# Patient Record
Sex: MALE | Race: WHITE | HISPANIC OR LATINO | Employment: UNEMPLOYED | URBAN - METROPOLITAN AREA
[De-identification: names, ages, dates, MRNs, and addresses within clinical notes are randomized per-mention and may not be internally consistent; named-entity substitution may affect disease eponyms.]

---

## 2021-10-11 ENCOUNTER — HOSPITAL ENCOUNTER (EMERGENCY)
Facility: MEDICAL CENTER | Age: 26
End: 2021-10-11
Attending: EMERGENCY MEDICINE
Payer: MEDICAID

## 2021-10-11 VITALS
OXYGEN SATURATION: 98 % | HEART RATE: 55 BPM | BODY MASS INDEX: 24.38 KG/M2 | SYSTOLIC BLOOD PRESSURE: 110 MMHG | RESPIRATION RATE: 14 BRPM | HEIGHT: 72 IN | TEMPERATURE: 97.8 F | WEIGHT: 180 LBS | DIASTOLIC BLOOD PRESSURE: 72 MMHG

## 2021-10-11 DIAGNOSIS — Z86.59 HISTORY OF BIPOLAR DISORDER: ICD-10-CM

## 2021-10-11 DIAGNOSIS — R45.851 SUICIDAL IDEATION: ICD-10-CM

## 2021-10-11 LAB
AMPHET UR QL SCN: NEGATIVE
BARBITURATES UR QL SCN: NEGATIVE
BENZODIAZ UR QL SCN: NEGATIVE
BZE UR QL SCN: NEGATIVE
CANNABINOIDS UR QL SCN: POSITIVE
METHADONE UR QL SCN: NEGATIVE
OPIATES UR QL SCN: NEGATIVE
OXYCODONE UR QL SCN: NEGATIVE
PCP UR QL SCN: NEGATIVE
POC BREATHALIZER: 0 PERCENT (ref 0–0.01)
PROPOXYPH UR QL SCN: NEGATIVE

## 2021-10-11 PROCEDURE — 99285 EMERGENCY DEPT VISIT HI MDM: CPT

## 2021-10-11 PROCEDURE — 302970 POC BREATHALIZER: Performed by: EMERGENCY MEDICINE

## 2021-10-11 PROCEDURE — 80307 DRUG TEST PRSMV CHEM ANLYZR: CPT

## 2021-10-11 PROCEDURE — 90791 PSYCH DIAGNOSTIC EVALUATION: CPT

## 2021-10-11 PROCEDURE — 99283 EMERGENCY DEPT VISIT LOW MDM: CPT | Mod: 95 | Performed by: STUDENT IN AN ORGANIZED HEALTH CARE EDUCATION/TRAINING PROGRAM

## 2021-10-11 RX ORDER — GABAPENTIN 300 MG/1
300 CAPSULE ORAL 3 TIMES DAILY
COMMUNITY

## 2021-10-11 RX ORDER — CLONAZEPAM 1 MG/1
1 TABLET ORAL 2 TIMES DAILY
COMMUNITY

## 2021-10-11 RX ORDER — PROPRANOLOL HYDROCHLORIDE 20 MG/1
20 TABLET ORAL 2 TIMES DAILY
COMMUNITY

## 2021-10-11 RX ORDER — LAMOTRIGINE 25 MG/1
25 TABLET ORAL 2 TIMES DAILY
COMMUNITY

## 2021-10-11 ASSESSMENT — ENCOUNTER SYMPTOMS
MYALGIAS: 0
DOUBLE VISION: 0
CLAUDICATION: 0
TINGLING: 0
VOMITING: 0
COUGH: 0
SHORTNESS OF BREATH: 0
NAUSEA: 0
HEADACHES: 0
DEPRESSION: 1

## 2021-10-11 ASSESSMENT — LIFESTYLE VARIABLES: SUBSTANCE_ABUSE: 0

## 2021-10-11 NOTE — ED TRIAGE NOTES
Chief Complaint   Patient presents with   • Psych Eval     PT BIB [facility] with c/o having feeling of wanting to hurt himself after his grand Ma passed 3 days ago     Blood Pressure 116/77   Pulse (Abnormal) 50   Temperature 36.1 °C (97 °F) (Temporal)   Respiration 14   Height 1.829 m (6')   Weight 81.6 kg (180 lb)   Oxygen Saturation 98%   Body Mass Index 24.41 kg/m²

## 2021-10-11 NOTE — ED NOTES
Med Rec completed: per Pt at bedside and phone call to [facility] in [place] to clarify current medications.     No ORAL antibiotics in last 30 days    Preferred Pharmacy: [facility] (From out of town)    Pt confirmed following allergies:  No Known Allergies     Pt's home medications:   No current facility-administered medications on file prior to encounter.     Medication Sig   • propranolol (INDERAL) 20 MG Tab Take 20 mg by mouth 2 times a day.   • gabapentin (NEURONTIN) 300 MG Cap Take 300 mg by mouth 3 times a day.   • lamoTRIgine (LAMICTAL) 25 MG Tab Take 25 mg by mouth 2 times a day.   • clonazePAM (KLONOPIN) 1 MG Tab Take 1 mg by mouth 2 times a day.

## 2021-10-11 NOTE — DISCHARGE INSTRUCTIONS
Follow-up with primary care and behavioral health when you return to [place] for reevaluation and medication management.    Continue home medications as previously indicated.    Return to the emergency department for any recurrent or persistent suicidal ideation, homicidal ideation, hallucinations or other new concerns.

## 2021-10-11 NOTE — DISCHARGE PLANNING
Medical Social Work    MSW received a copy of pt's legal hold from Alert Team.  Unable to send mental health referrals at this time pending registration and H&P.

## 2021-10-11 NOTE — CONSULTS
"[facility]   TRIAGE ASSESSMENT    Name: Yamilex Rashid  MRN: [id number]  : 1995  Age: 25 y.o.  Date of assessment: 10/11/2021  PCP: No primary care provider on file.  Persons in attendance: Patient  Patient Location: [facility]    CHIEF COMPLAINT/PRESENTING ISSUE (as stated by Patient): No chief complaint on file.   Patient is a 26 y/o male BIB EMS endorsing suicidal thoughts increasing and persistent over the passed 3 days since arriving out of state to take care for his grandmother then informed that she had passed away. Patient reports a Bipolar diagnose history; currently prescribed and compliant with Lamictal, Gabapentin and Klonopin medications. Patient reports although he does have a return ticket  he does not have any social support back in [place], \"Everything has unraveled back there, I cant' go back,\" declined to elaborate further. Patient reports father lives in [place] but unable to safely travel at this time due to his current SI and does not have any social support in [place] at present. Patient would benefit further psychiatric evaluation, stabilization and treatment at this time.     CURRENT LIVING SITUATION/SOCIAL SUPPORT/FINANCIAL RESOURCES: Patient moved from [place] 3 days ago to care for grandmother. Upon arrival patient's grandmother had passed away. Patient reports currently staying at [facility] and has a return ticket which he will likely transfer to [place], [place] where father resides but unable to contract for safety at this time.     BEHAVIORAL HEALTH/SUBSTANCE USE TREATMENT HISTORY  Does patient/parent report a history of prior behavioral health/substance use treatment for patient?   Reports previously engaged in [facility] treatment for Bipolar D/O from MEGAN Lemus at [facility]. Patient reports previously hospitalized for suicidal thoughts ages 15 and 18. Current med regime:  Lamictal 25 mg BID  Gabapentin 300 mg TID  Klonopin 1 mg " "BID  Propranolol 20 mg BID    SAFETY ASSESSMENT - SELF  Does patient acknowledge current or past symptoms of dangerousness to self or is previous history noted? yes  Does parent/significant other report patient has current or past symptoms of dangerousness to self? N\A  Does presenting problem suggest symptoms of dangerousness to self? Yes:     Past Current    Suicidal Thoughts: []  [x]    Suicidal Plans: []  []    Suicidal Intent: []  []    Suicide Attempts: []  []    Self-Injury []  []      For any boxes checked above, provide detail: Patient endorsing SI x 3 days increasing in frequency, duration and intensity, \"I'm tired of it all.\" Vocalizing grief,  hopelessness, helplessness, \"I don't want to live anymore.\" Patient reports previous hospitalizations for SI at age 15 and 18.      History of suicide by family member: no  History of suicide by friend/significant other: yes - friend completed suicide  Recent change in frequency/specificity/intensity of suicidal thoughts or self-harm behavior? yes - 3 days  Current access to firearms, medications, or other identified means of suicide/self-harm? no  If yes, willing to restrict access to means of suicide/self-harm? yes - L2K, belongings secure; awaiting transfer to psychiatric facility.   Protective factors present:  Compliant with current medication regime.    SAFETY ASSESSMENT - OTHERS  Does patient acknowledge current or past symptoms of aggressive behavior or risk to others or is previous history noted? no  Does parent/significant other report patient has current or past symptoms of aggressive behavior or risk to others?  n/a  Does presenting problem suggest symptoms of dangerousness to others? No; denies HI or aggression hx.     LEGAL HISTORY  Does patient acknowledge history of arrest/FDC/USP or is previous history noted? no    Crisis Safety Plan completed and copy given to patient? N\A    ABUSE/NEGLECT SCREENING  Does patient report feeling “unsafe” in " "his/her home, or afraid of anyone?  no  Does patient report any history of physical, sexual, or emotional abuse?  no  Does parent or significant other report any of the above? N\A  Is there evidence of neglect by self?  no  Is there evidence of neglect by a caregiver? no  Does the patient/parent report any history of CPS/APS/police involvement related to suspected abuse/neglect or domestic violence? no  Based on the information provided during the current assessment, is a mandated report of suspected abuse/neglect being made?  No    SUBSTANCE USE SCREENING  Yes:  Ruben all substances used in the past 30 days: Denies any substance or alcohol use.       Last Use Amount   []   Alcohol     []   Marijuana     []   Heroin     []   Prescription Opioids  (used without prescription, for    recreation, or in excess of prescribed amount)     []   Other Prescription  (used without prescription, for    recreation, or in excess of prescribed amount)     []   Cocaine      []   Methamphetamine     []   \"\" drugs (ectasy, MDMA)     []   Other substances        UDS results: pending  Breathalyzer results: 0.00    What consequences does the patient associate with any of the above substance use and or addictive behaviors? None    Risk factors for detox (check all that apply):  []  Seizures   []  Diaphoretic (sweating)   []  Tremors   []  Hallucinations   []  Increased blood pressure   []  Decreased blood pressure   []  Other   [x]  None      [x] Patient education on risk factors for detoxification and instructed to return to ER as needed.       MENTAL STATUS   Participation: Active verbal participation and Engaged  Grooming: Casual  Orientation: Alert and Fully Oriented  Behavior: Calm  Eye contact: Limited  Mood: Depressed  Affect: Blunted  Thought process: Logical  Thought content: Within normal limits  Speech: Soft and Hypotalkative  Perception: Within normal limits  Memory:  No gross evidence of memory deficits  Insight: " Limited  Judgment:  Limited  Other:    Collateral information:   Source:  [] Significant other present in person:   [] Significant other by telephone  [] [facility]   [x] [facility] Nursing Staff  [x] [facility] Medical Record  [x] Other: ERP    [] Unable to complete full assessment due to:  [] Acute intoxication  [] Patient declined to participate/engage  [] Patient verbally unresponsive  [] Significant cognitive deficits  [] Significant perceptual distortions or behavioral disorganization  [x] Other: N/A      CLINICAL IMPRESSIONS:  Primary: Suicidal Ideation  Secondary: Depression, Grief        IDENTIFIED NEEDS/PLAN:  [Trigger DISPOSITION list for any items marked]    [x]  Imminent safety risk - self [] Imminent safety risk - others   []  Acute substance withdrawal []  Psychosis/Impaired reality testing   [x]  Mood/anxiety []  Substance use/Addictive behavior   []  Maladaptive behaviro []  Parent/child conflict   []  Family/Couples conflict []  Biomedical   []  Housing []  Financial   []   Legal  Occupational/Educational   []  Domestic violence []  Other:     Recommended Plan of Care:  Actively being addressed by Legal [facility], [facility] Emergency Department and [facility]; Q 15 checks due to SI without plan.   *Telesitter may not be utilized for moderate or high risk patients    Has the Recommended Plan of Care/Level of Observation been reviewed with the patient's assigned nurse? yes    Does patient/parent or guardian express agreement with the above plan? yes    Referral appointment(s) scheduled? N\A    Alert team only: Patient endorsing persistent SI increasing over the past 3 days. Patient reports flying out 5 days ago to take care of his grandmother but she passed away the day patient arrived. Although patient has a plane ticket he does not feel safe traveling back to [place] or trading ticket to [place] where father resides. Patient does not have any social support in [place] and would benefit further psychiatric  stabilization before resuming travel plans.     HELP (Homeless Evaluation Liaison Program) Officers-this service is provided by [place] [employer] to help homeless individuals from other communities return home to family; bus ticket assistance.   Must have someone in destination city willing to .   Office open Mon and Tues  8am-Noon.     [address]    [phone]    I have discussed findings and recommendations with Dr. Marr who is in agreement with these recommendations.     Referral information sent to the following community providers : psychiatric facility, Patient reports Out of State Medicaid     If applicable : Referred  to : [facility] for legal hold follow up at 0600      Isabelle Padilla R.N.  10/11/2021

## 2021-10-11 NOTE — DISCHARGE PLANNING
Medical Social Work    Referral: Legal Hold    Intervention: Legal Hold Paperwork given to SW by [employer] RN.    Legal Hold Initiated: Date: 10/11/2021             Time: 3954    Legal Hold faxed: Date: 10/11/2021                Time: 1010     Patient's Insurance Listed on Face Sheet: Out of state Medicaid.      Referrals sent to: [facility].     Plan: Patient will transfer to mental health facility once acceptance is obtained.

## 2021-10-11 NOTE — ED PROVIDER NOTES
"ED Provider Note    CHIEF COMPLAINT  Chief Complaint   Patient presents with   • Psych Eval     PT BIB [facility] with c/o having feeling of wanting to hurt himself after his grand Ma passed 3 days ago       HPI  Yamilex Rashid is a 25 y.o. male who presents to the emergency department by ambulance for suicidal ideation.  Patient states \"am not sure what to do, I do not want to go on with this life.\"  Patient states that he came from [place] 5 days ago to visit and live with his grandmother, but she  just before his arrival.  He has been dealing with the stress of that and trying to manage her affairs.  He feels \"desperate.\"  He has thought of \"lots of ways\" to harm himself \"but I would never have the balls to do it.\"  Denies any intent to harm anyone else.  Denies hallucination.  \"But I know I need help.\"  Patient states he was speaking with his father about this prior to arrival who encouraged him to call EMS.    Patient with history of bipolar, explosive disorder, anxiety and depression for which she is compliant with gabapentin, Lamictal and clonazepam.  He also takes a blood pressure medication.  Adamantly denies any drugs or alcohol.    REVIEW OF SYSTEMS  See HPI for further details. All other systems are negative.     PAST MEDICAL HISTORY   has a past medical history of Bipolar affective (HCC).Denies other than above    SOCIAL HISTORY  Social History     Tobacco Use   • Smoking status: Not on file   Substance and Sexual Activity   • Alcohol use: Not on file   • Drug use: Not on file   • Sexual activity: Not on file   Denies all    SURGICAL HISTORY  patient denies any surgical history    CURRENT MEDICATIONS  Home Medications     Reviewed by Elvin Greer (Pharmacy Tech) on 10/11/21 at 0616  Med List Status: Complete   Medication Last Dose Status   clonazePAM (KLONOPIN) 1 MG Tab 10/10/2021 Active   gabapentin (NEURONTIN) 300 MG Cap 10/10/2021 Active   lamoTRIgine (LAMICTAL) 25 MG Tab 10/10/2021 " Active   propranolol (INDERAL) 20 MG Tab 10/10/2021 Active            As above    ALLERGIES  No Known Allergies    PHYSICAL EXAM  VITAL SIGNS: /77   Pulse (!) 50   Temp 36.1 °C (97 °F) (Temporal)   Resp 14   Ht 1.829 m (6')   Wt 81.6 kg (180 lb)   SpO2 98%   BMI 24.41 kg/m²   Pulse ox interpretation: I interpret this pulse ox as normal.  Constitutional: Alert in no apparent distress.  HENT: Normocephalic, atraumatic. Bilateral external ears normal, Nose normal.   Eyes: Conjunctiva normal.   Neck: Normal range of motion, Supple  Lymphatic: No lymphadenopathy noted.   Cardiovascular: Normal peripheral perfusion.  Thorax & Lungs: Nonlabored respirations.  Skin: Warm, Dry, No erythema, No rash.   Musculoskeletal: Good range of motion in all major joints. No major deformities noted.   Neurologic: Alert and oriented x4.  Speech clear and cohesive.  Moves her extremity spontaneously.  Psychiatric: Flat affect.  Depressed.  Suicidal.  Denies homicidal ideation.  Denies hallucination.  Cooperative.    DIAGNOSTIC STUDIES / PROCEDURES    LABS  Results for orders placed or performed during the hospital encounter of 10/11/21   Urine Drug Screen   Result Value Ref Range    Amphetamines Urine Negative Negative    Barbiturates Negative Negative    Benzodiazepines Negative Negative    Cocaine Metabolite Negative Negative    Methadone Negative Negative    Opiates Negative Negative    Oxycodone Negative Negative    Phencyclidine -Pcp Negative Negative    Propoxyphene Negative Negative    Cannabinoid Metab Positive (A) Negative   POC Breathalizer   Result Value Ref Range    POC Breathalizer 0.000 0.00 - 0.01 Percent       COURSE & MEDICAL DECISION MAKING  Patient was seen evaluated at bedside.  Longstanding history of bipolar, depression, compliant with home medications.  Recently relocated to [place], however just before his arrival his grandmother passed away.  No friends or close contacts here.  He does not wish to return  "to [place].  He rather go to floor to be with his father.  But until then he is having increasing suicidal ideations although \"I would have the balls to do anything.\"  Seeking help today.  Will await alert team assessment.    0552 on 10/11/2021 patient has been placed in ED observation as a legal hold is in place and has been completed for suicidal ideation, patient cannot contract for safety.  He will remain in observation until transfer to a psychiatric facility.  Family history: Patient denies associated family history or those with mental illness.    11:25 AM patient has been seen evaluated by psychiatry, Dr. Hamlin.  \"Good insight into his mental health, appropriately utilize services when in crisis, now feeling better and has plan to return to [place], has meetings, had outpatient providers and meds, now denying SI.\"  I have evaluated patient at bedside again as well.  Still denying suicidal ideation or any plan to harm himself.  Contracts for safety.  Will return to [place].  Legal hold has been discontinued.    Patient is stable for discharge, encouraged to continue his home medications, follow-up with primary care and behavioral health when he returns to [place], and strict ED return instructions have been detailed.  Patient is aware of the recommendations and agreeable to the disposition and plan.    11:26 AM on 10/11/2021 patient has been discharged from ED observation in stable condition.      FINAL IMPRESSION  (R45.851) Suicidal ideation  (Z86.59) History of bipolar disorder      Electronically signed by: Miranda Marr D.O., 10/11/2021 6:10 AM      This dictation was created using voice recognition software. The accuracy of the dictation is limited to the abilities of the software. I expect there may be some errors of grammar and possibly content. The nursing notes were reviewed and certain aspects of this information were incorporated into this note.        "

## 2021-10-11 NOTE — ED NOTES
Pt discharged home as ordered by erp. Pt instructed to follow up with his PCP and continue his home medication. Pt verbalized understanding. Pt given local resources. Pt verbalized understanding. Pt left ambulating independently

## 2021-10-11 NOTE — CONSULTS
"                  [facility] INITIAL PSYCHIATRIC EVALUATION    This provider informed the patient their medical records are totally confidential except for the use by other providers involved in their care, or if the patient signs a release, or to report instances of child or elder abuse, or if it is determined they are an immediate risk to harm themselves or others.    This evaluation was conducted via Zoom using secure and encrypted videoconferencing technology. The patient was physically located at [facility] in [place], [place]. The patient was presented by a medical professional at the originating site.  The patient's identity was confirmed and verbal consent was obtained for this telemedicine encounter.      Room:  32/32 GRN    CONSULTED BY:   Miranda Marr D.o.     REASON FOR CONSULT:   Psychiatric evaluation    Chief Complaint   Patient presents with   • Psych Eval     PT BIB [facility] with c/o having feeling of wanting to hurt himself after his grand Ma passed 3 days ago         HISTORY OF PRESENT ILLNESS  Chart reviewed.  Patient seen at bedside through telemedicine. Patient says that last night was a \"rough night\". He says that it was \"one thing after another\", dealing with loss of grandmother, tension with cousin. Patient felt alone, in a new place, he admits that he argued with his cousin, who he was supposed to be staying with and he went out for a walk, he says that it started snowing and reports, \"even the weather was sad\". He talked to his father (admits not bio dad but father figure) who encouraged him to call EMS. Patient admits to having SI \"on and off\" throughout his life but he admits that last night was \"not the worst, I just didn't want to be alone\". He says that he has thought of many methods but he has never tried, he denies any history of actual suicide attempts. He does not think that he would ever actually do it.   Denies any SI at this time, he says that he has a plan " "to return to [place], \"I don't think [employer] is working out\". Patient has a return ticket home. He also has medications and refills available. Longer term, he plans on moving to [place] with his father.     Depression: endorses frequent sad/irritable mood, feelings of helplessness/hopelessness \"come and go\" denies anhedonia, denies SI at this time, denies changes in sleep or appetite, denies poor motivation     Anxiety: frequent worry, mostly related to life events    Afua: denies     Psychosis:     Abuse/Trauma: denies       MMSE    -What is the:  Year, season, date, day, month.               5/5 points    -Where are we:   State, county, town, hospital, floor.      5/5 points      MEDICAL REVIEW OF SYSTEMS:   Review of Systems   Eyes: Negative for double vision.   Respiratory: Negative for cough and shortness of breath.    Cardiovascular: Negative for chest pain and claudication.   Gastrointestinal: Negative for nausea and vomiting.   Genitourinary: Negative for urgency.   Musculoskeletal: Negative for myalgias.   Neurological: Negative for tingling and headaches.   Psychiatric/Behavioral: Positive for depression. Negative for substance abuse and suicidal ideas.         PAST PSYCHIATRIC HISTORY  Hospitalizations for SI as a teenager, denies hx of attempts  Multiple past meds with poor effect     FAMILY HISTORY  Denied       SOCIAL HISTORY  Originally from [place], grandmother recently , few supports, none in [place], Father in [place]     DRUGS: frequent cannabis denies others     ALCOHOL: denies     TOBACCO: denies     MEDICAL HISTORY         Past Medical History:   Diagnosis Date   • Bipolar affective (HCC)      No results found for this or any previous visit.    No results found for: WBC, RBC, HEMOGLOBIN, HEMATOCRIT, MCV, MCH, MCHC, MPV, NEUTSPOLYS, LYMPHOCYTES, MONOCYTES, EOSINOPHILS, BASOPHILS, HYPOCHROMIA, ANISOCYTOSIS   No results found for: SODIUM, POTASSIUM, CHLORIDE, CO2, GLUCOSE, BUN, " "CREATININE, BUNCREATRAT, GLOMRATE     No results found for this or any previous visit from the past 1000 days.      CURRENT MEDICATIONS       No current facility-administered medications for this encounter.    Current Outpatient Medications:   •  propranolol, 20 mg, Oral, BID, 10/10/2021 at PM  •  gabapentin, 300 mg, Oral, TID, 10/10/2021 at PM  •  lamoTRIgine, 25 mg, Oral, BID, 10/10/2021 at PM  •  clonazePAM, 1 mg, Oral, BID, 10/10/2021 at PM     ALLERGIES       No Known Allergies    MENTAL STATUS EXAMINATION    /77   Pulse (!) 50   Temp 36.1 °C (97 °F) (Temporal)   Resp 14   Ht 1.829 m (6')   Wt 81.6 kg (180 lb)   SpO2 98%   BMI 24.41 kg/m²   Participation: cooperative, calm, appropriate   Appearance: appears stated age, NAD, multiple tattoos   Orientation: Fully Oriented  Eye contact: Good  Behavior:Calm   Mood: Euthymic  Affect: Constricted  Thought process: Goal-directed  Thought content:  Rumination  Speech: Rate within normal limits  Perception:  Within normal limits  Memory:  No gross evidence of memory deficits  Insight: Adequate  Judgment: Adequate  Family/couple interaction observations:   Other:    CURRENT RISK       Suicidal:Low       Homicidal:Low       Self-Harm:Low       Relapse:Moderate       Crisis Safety Plan Reviewed No    ASSESSMENT       25 y.o. male with hx of Bipolar dso, admitted on 10/11/2021 for SI, psychiatry consulted for evaluation. On exam patient is cooperative, endorsing hx of low mood, denies SI/HI/AHVH at this time. He provides a clear narrative for what he was feeling last night and despite believing he has poor coping skills, he actually employed them by calling family for help yesterday and then coming to the ED. He has an outpatient psychiatrist and therapist. He says that he would have called his therapist last night \"but it was late\". He plans to communicate with him after discharge from the ED. Patient currently feels relieved by having a plan to return \"home\", " he denies any SI, intent, method or plan at this moment. He does not have any hx of attempts. He endorses adherence with medications. Acute risk to self or others is low at this time, however, given his hx his chronic risk is elevated. Protective factors include, improved insight into illness, motivation to get better, treatment adherence and use of coping skills. Patient is psychiatrically clear for discharge. He is aware that he can return to the ED or call 911, as he appropriately did on this occasion, if he were to experience any physical or psychological distress. Discontinue legal hold.       DSM-5 Dx  Bipolar II disorder   Grief     PLAN  Continue outpatient meds as written   Follow up with outpatient therapist and psychiatrist   If new or worsening SI/HI or intense psychological distress call 911 or go to nearest ED      Legal Hold: Discontinued        Discussed findings, diagnosis and recommendations with Dr. Marr.

## 2022-09-25 NOTE — ED NOTES
Assumed pt care. Pt sleeping on gurney. Sitter at bedside    FAMILY HISTORY:  No pertinent family history in first degree relatives